# Patient Record
Sex: MALE | Race: WHITE | URBAN - METROPOLITAN AREA
[De-identification: names, ages, dates, MRNs, and addresses within clinical notes are randomized per-mention and may not be internally consistent; named-entity substitution may affect disease eponyms.]

---

## 2022-08-04 ENCOUNTER — OFFICE VISIT (OUTPATIENT)
Dept: URBAN - METROPOLITAN AREA CLINIC 128 | Facility: CLINIC | Age: 52
End: 2022-08-04
Payer: COMMERCIAL

## 2022-08-04 ENCOUNTER — WEB ENCOUNTER (OUTPATIENT)
Dept: URBAN - METROPOLITAN AREA CLINIC 128 | Facility: CLINIC | Age: 52
End: 2022-08-04

## 2022-08-04 VITALS
WEIGHT: 216.2 LBS | HEIGHT: 71 IN | DIASTOLIC BLOOD PRESSURE: 87 MMHG | SYSTOLIC BLOOD PRESSURE: 143 MMHG | TEMPERATURE: 98.3 F | BODY MASS INDEX: 30.27 KG/M2 | HEART RATE: 55 BPM

## 2022-08-04 DIAGNOSIS — R15.1 FECAL SMEARING: ICD-10-CM

## 2022-08-04 DIAGNOSIS — Z12.11 COLON CANCER SCREENING: ICD-10-CM

## 2022-08-04 PROCEDURE — 99203 OFFICE O/P NEW LOW 30 MIN: CPT | Performed by: INTERNAL MEDICINE

## 2022-08-04 NOTE — HPI-TODAY'S VISIT:
Pleasant 53yo gentleman who comes in for colon cancer screening and evaluation of anorectal issue with seepage between BMs for about one year.  He has to put toilet tissue in  the area to prevent soilage and odor.  No bleeding.  BMs are daily and generally well formed once a day.  No anorectal surgery or trauma.  No lumbar back disease or neuropathy.  No UGI symptoms.  No family hx of CRC or polyps or IBD.  He smokes 1/2 PPD and denies regular ETOH.  He drinks milk products regularly and has 2+ cups of coffee daily.  No regular medications but he does take some vitamins.  No heart disease.  He was transiently placed on Breo for resp issues in the past.  No renal disease.  No regular NSAIDs, no blood thinners.

## 2022-08-04 NOTE — PHYSICAL EXAM GASTROINTESTINAL
Abdomen soft, nontender, nondistended, no masses palpable , normal bowel sounds   Liver and Spleen , no hepatomegaly present, liver edge nontender , spleen not palpable   Rectal:  hemorrhoids, no mass, sphincter tone present.

## 2022-08-11 ENCOUNTER — OFFICE VISIT (OUTPATIENT)
Dept: URBAN - METROPOLITAN AREA SURGERY CENTER 31 | Facility: SURGERY CENTER | Age: 52
End: 2022-08-11
Payer: COMMERCIAL

## 2022-08-11 DIAGNOSIS — K62.89 ACUTE PROCTITIS: ICD-10-CM

## 2022-08-11 DIAGNOSIS — Z12.11 COLON CANCER SCREENING: ICD-10-CM

## 2022-08-11 DIAGNOSIS — D12.8 ADENOMATOUS POLYP OF RECTUM: ICD-10-CM

## 2022-08-11 PROCEDURE — G8907 PT DOC NO EVENTS ON DISCHARG: HCPCS | Performed by: INTERNAL MEDICINE

## 2022-08-11 PROCEDURE — 45385 COLONOSCOPY W/LESION REMOVAL: CPT | Performed by: INTERNAL MEDICINE

## 2022-08-11 PROCEDURE — 45380 COLONOSCOPY AND BIOPSY: CPT | Performed by: INTERNAL MEDICINE

## 2023-06-05 ENCOUNTER — OFFICE VISIT (OUTPATIENT)
Dept: URBAN - METROPOLITAN AREA CLINIC 128 | Facility: CLINIC | Age: 53
End: 2023-06-05
Payer: COMMERCIAL

## 2023-06-05 ENCOUNTER — LAB OUTSIDE AN ENCOUNTER (OUTPATIENT)
Dept: URBAN - METROPOLITAN AREA CLINIC 128 | Facility: CLINIC | Age: 53
End: 2023-06-05

## 2023-06-05 VITALS
SYSTOLIC BLOOD PRESSURE: 136 MMHG | BODY MASS INDEX: 30.8 KG/M2 | DIASTOLIC BLOOD PRESSURE: 84 MMHG | TEMPERATURE: 98.1 F | WEIGHT: 220 LBS | HEIGHT: 71 IN

## 2023-06-05 DIAGNOSIS — R15.1 FECAL SMEARING: ICD-10-CM

## 2023-06-05 DIAGNOSIS — Z86.010 PERSONAL HISTORY OF COLONIC POLYPS: ICD-10-CM

## 2023-06-05 PROCEDURE — 99213 OFFICE O/P EST LOW 20 MIN: CPT | Performed by: INTERNAL MEDICINE

## 2023-06-05 NOTE — HPI-TODAY'S VISIT:
Mr. Hughes comes in for follow up.  He offers generally softly formed stools.  No bleeding or urgency.  He is still having anal seepage/soiling between bowel movements.  He drinks 2 cups of coffee a day. No soda or energy drinks.  Limited lactose.  He offers fiber supplement and probiotic of no benefit either.  No prior anorectal surgery or trauma.  We discussed anal manometry as next step in evaluation.

## 2023-06-13 ENCOUNTER — DASHBOARD ENCOUNTERS (OUTPATIENT)
Age: 53
End: 2023-06-13

## 2023-06-14 PROBLEM — 428283002: Status: ACTIVE | Noted: 2023-06-14
